# Patient Record
Sex: FEMALE | Race: WHITE | URBAN - METROPOLITAN AREA
[De-identification: names, ages, dates, MRNs, and addresses within clinical notes are randomized per-mention and may not be internally consistent; named-entity substitution may affect disease eponyms.]

---

## 2019-12-24 ENCOUNTER — OFFICE VISIT (OUTPATIENT)
Dept: INTERNAL MEDICINE CLINIC | Age: 49
End: 2019-12-24
Payer: COMMERCIAL

## 2019-12-24 VITALS
DIASTOLIC BLOOD PRESSURE: 60 MMHG | OXYGEN SATURATION: 98 % | HEIGHT: 62 IN | HEART RATE: 100 BPM | SYSTOLIC BLOOD PRESSURE: 98 MMHG | BODY MASS INDEX: 28.16 KG/M2 | WEIGHT: 153 LBS

## 2019-12-24 DIAGNOSIS — F42.2 MIXED OBSESSIONAL THOUGHTS AND ACTS: ICD-10-CM

## 2019-12-24 DIAGNOSIS — E78.00 PURE HYPERCHOLESTEROLEMIA: ICD-10-CM

## 2019-12-24 DIAGNOSIS — G43.709 CHRONIC MIGRAINE WITHOUT AURA WITHOUT STATUS MIGRAINOSUS, NOT INTRACTABLE: ICD-10-CM

## 2019-12-24 PROCEDURE — 99204 OFFICE O/P NEW MOD 45 MIN: CPT | Performed by: INTERNAL MEDICINE

## 2019-12-24 RX ORDER — BUTALBITAL, ACETAMINOPHEN AND CAFFEINE 300; 40; 50 MG/1; MG/1; MG/1
1 CAPSULE ORAL EVERY 4 HOURS PRN
COMMUNITY
End: 2020-11-20

## 2019-12-24 RX ORDER — RIZATRIPTAN BENZOATE 10 MG/1
TABLET ORAL
Qty: 30 TABLET | Refills: 2 | Status: SHIPPED
Start: 2019-12-24 | End: 2020-11-20 | Stop reason: ALTCHOICE

## 2019-12-24 RX ORDER — TOPIRAMATE 25 MG/1
25 TABLET ORAL 2 TIMES DAILY
COMMUNITY
End: 2019-12-24 | Stop reason: SDUPTHER

## 2019-12-24 RX ORDER — RIZATRIPTAN BENZOATE 10 MG/1
10 TABLET ORAL
COMMUNITY
End: 2019-12-24

## 2019-12-24 RX ORDER — ONDANSETRON 4 MG/1
4 TABLET, ORALLY DISINTEGRATING ORAL EVERY 8 HOURS PRN
Qty: 12 TABLET | Refills: 1 | Status: SHIPPED | OUTPATIENT
Start: 2019-12-24 | End: 2020-11-20

## 2019-12-24 RX ORDER — TOPIRAMATE 50 MG/1
50 TABLET, FILM COATED ORAL 2 TIMES DAILY
Qty: 60 TABLET | Refills: 5 | Status: SHIPPED
Start: 2019-12-24 | End: 2020-03-03 | Stop reason: DRUGHIGH

## 2019-12-24 SDOH — HEALTH STABILITY: MENTAL HEALTH: HOW OFTEN DO YOU HAVE A DRINK CONTAINING ALCOHOL?: 2-4 TIMES A MONTH

## 2019-12-24 SDOH — HEALTH STABILITY: MENTAL HEALTH: HOW MANY STANDARD DRINKS CONTAINING ALCOHOL DO YOU HAVE ON A TYPICAL DAY?: 1 OR 2

## 2019-12-24 ASSESSMENT — PATIENT HEALTH QUESTIONNAIRE - PHQ9
SUM OF ALL RESPONSES TO PHQ9 QUESTIONS 1 & 2: 0
SUM OF ALL RESPONSES TO PHQ QUESTIONS 1-9: 0
SUM OF ALL RESPONSES TO PHQ QUESTIONS 1-9: 0
1. LITTLE INTEREST OR PLEASURE IN DOING THINGS: 0
2. FEELING DOWN, DEPRESSED OR HOPELESS: 0

## 2020-02-23 ENCOUNTER — PATIENT MESSAGE (OUTPATIENT)
Dept: INTERNAL MEDICINE CLINIC | Age: 50
End: 2020-02-23

## 2020-03-03 RX ORDER — TOPIRAMATE 100 MG/1
100 TABLET, FILM COATED ORAL 2 TIMES DAILY
Qty: 60 TABLET | Refills: 3 | Status: SHIPPED | OUTPATIENT
Start: 2020-03-03 | End: 2020-07-13

## 2020-05-29 ENCOUNTER — VIRTUAL VISIT (OUTPATIENT)
Dept: INTERNAL MEDICINE CLINIC | Age: 50
End: 2020-05-29
Payer: COMMERCIAL

## 2020-05-29 VITALS — BODY MASS INDEX: 27.76 KG/M2 | WEIGHT: 151.8 LBS | TEMPERATURE: 96.9 F

## 2020-05-29 PROBLEM — F41.9 ANXIETY: Status: ACTIVE | Noted: 2018-11-07

## 2020-05-29 PROCEDURE — 99214 OFFICE O/P EST MOD 30 MIN: CPT | Performed by: INTERNAL MEDICINE

## 2020-05-29 SDOH — ECONOMIC STABILITY: INCOME INSECURITY: HOW HARD IS IT FOR YOU TO PAY FOR THE VERY BASICS LIKE FOOD, HOUSING, MEDICAL CARE, AND HEATING?: NOT HARD AT ALL

## 2020-05-29 SDOH — ECONOMIC STABILITY: FOOD INSECURITY: WITHIN THE PAST 12 MONTHS, THE FOOD YOU BOUGHT JUST DIDN'T LAST AND YOU DIDN'T HAVE MONEY TO GET MORE.: NEVER TRUE

## 2020-05-29 SDOH — ECONOMIC STABILITY: FOOD INSECURITY: WITHIN THE PAST 12 MONTHS, YOU WORRIED THAT YOUR FOOD WOULD RUN OUT BEFORE YOU GOT MONEY TO BUY MORE.: NEVER TRUE

## 2020-05-29 ASSESSMENT — PATIENT HEALTH QUESTIONNAIRE - PHQ9
SUM OF ALL RESPONSES TO PHQ9 QUESTIONS 1 & 2: 0
SUM OF ALL RESPONSES TO PHQ QUESTIONS 1-9: 0
2. FEELING DOWN, DEPRESSED OR HOPELESS: 0
1. LITTLE INTEREST OR PLEASURE IN DOING THINGS: 0
SUM OF ALL RESPONSES TO PHQ QUESTIONS 1-9: 0

## 2020-07-13 RX ORDER — TOPIRAMATE 100 MG/1
TABLET, FILM COATED ORAL
Qty: 60 TABLET | Refills: 5 | Status: SHIPPED | OUTPATIENT
Start: 2020-07-13

## 2020-10-26 ENCOUNTER — NURSE ONLY (OUTPATIENT)
Dept: INTERNAL MEDICINE CLINIC | Age: 50
End: 2020-10-26
Payer: COMMERCIAL

## 2020-10-26 PROCEDURE — 90471 IMMUNIZATION ADMIN: CPT | Performed by: INTERNAL MEDICINE

## 2020-10-26 PROCEDURE — 90686 IIV4 VACC NO PRSV 0.5 ML IM: CPT | Performed by: INTERNAL MEDICINE

## 2020-11-20 RX ORDER — UBROGEPANT 100 MG/1
1 TABLET ORAL PRN
COMMUNITY

## 2020-11-23 NOTE — PROGRESS NOTES
2020    TELEHEALTH EVALUATION -- Audio/Visual ([facility])    HPI:  Diego Barragan (:  1970) has requested an audio/video evaluation for the following concern(s):    Joint Symptoms:  Patient complains of a 6 month history of pain in left shoulder. Pain is persistent, sharp in nature, and is moderate in intensity. Radiation: into hand. Associated symptoms:  weakness- throughout arm and paresthesia- throughout arm. She denies any other symptoms. Symptoms are exacerbated by abduction, laying down. Precipitating factors: no known injury. Symptoms are worse in the middle of the night. Prior history of similar symptoms: no, but has had issues on the right that have improved. Previous treatment: stretching. Symptoms are worsening over time. Chronic Headache:  Patient presents for follow-up of migraine headache. Patient saw [address] [place] [address] [address] neurology - had 1 round of Botox- effective, but has worn off. Had only 1-2 episodes over the 3 months after injection, but has had 7 over the past month. Recent change in symptom quality or new associated symptoms:  no.  Current triggers:  alcohol, menses and weather changes. Current abortive treatment includes Ubrelvy 100 mg prn- more effective than MaxAlt and Fioricet. Preventive treatment: anti-convulsant- topamax 150 mg qam and 100 mg qpm.  Medication side effects: none. Emergency department/urgent care visits for headache since last visit: none. Recent diagnostic testing: none. Hyperlipidemia:  Patient is following a low fat, low cholesterol diet. She is exercising regularly- walking. Mood Disorder:  Patient presents for follow-up of obsessive-compulsive disorder and anxiety. Current complaints include:  Irritability, compulsive behaviors: cleanliness and organization, mild social anxiety- a little better.   She denies anhedonia, depressed mood, tearfulness, feelings of hopelessness, excessive worry, panic attacks and suicidal thoughts or behavior. Symptoms/signs of licha: none. External stressors: none. Current treatment includes: Zoloft- 100 mg qd. Medication side effects: none. Prior to Visit Medications    Medication Sig Taking? Authorizing Provider   sertraline (ZOLOFT) 100 MG tablet Take 1 tablet by mouth daily Yes Cely Burgess MD   Ubrogepant (UBRELVY) 100 MG TABS Take 1 tablet by mouth as needed Yes Historical Provider, MD   topiramate (TOPAMAX) 100 MG tablet TAKE ONE TABLET BY MOUTH TWICE A DAY  Patient taking differently: Take 150 mg by mouth 2 times daily  Yes Cely Burgess MD       Patient-Reported Vitals 11/24/2020   Patient-Reported Weight 151.2   Patient-Reported Systolic 93   Patient-Reported Diastolic 74   Patient-Reported Pulse 76        Wt Readings from Last 3 Encounters:   05/29/20 151 lb 12.8 oz (68.9 kg)   12/24/19 153 lb (69.4 kg)     BP Readings from Last 3 Encounters:   12/24/19 98/60       Review of Systems:   As documented in HPI     Physical Exam  Constitutional:       General: She is not in acute distress. Appearance: She is well-developed. HENT:      Head: Normocephalic and atraumatic. Mouth/Throat:      Lips: No lesions. Neck:      Comments: No visible mass  Pulmonary:      Effort: Pulmonary effort is normal. No respiratory distress. Skin:     Comments: No rash, erythema or other discoloration on facial skin and exposed areas of neck and upper extremites    Neurological:      Mental Status: She is alert. Comments: No facial asymmetry (cranial nerve 7 motor function) or gaze palsy   Psychiatric:         Attention and Perception: Attention normal.         Mood and Affect: Mood normal.         Speech: Speech normal.         Behavior: Behavior normal.         Thought Content:  Thought content normal.         Cognition and Memory: Cognition normal.        No results found for: CHOLFAST, TRIGLYCFAST, HDL, LDLCALC, LDLDIRECT  No results found for: GLUF, LABA1C  No results found for: NA, K, BUN, CREATININE, LABGLOM, GFRAA, CALCIUM, VITD25  No results found for: ALT, AST  No results found for: HGB  No results found for: TSHREFLEX, TSH        ASSESSMENT/PLAN:    1. Acute pain of left shoulder  Clinical picture most consistent with impingement syndrome. Ice, NSAID, ortho referral.  - Keagan Mathis MD, Orthopedic Surgery, [facility]    2. Chronic migraine without aura without status migrainosus, not intractable  Dramatic improvement with Botox injections- she will schedule second round. Continue other preventive and symptomatic therapies. 3. Pure hypercholesterolemia  Importance of continued lifestyle changes stressed to help prevent need for cholesterol medication in the future. 4. Mixed obsessional thoughts and acts  Well-controlled on higher dose of Zoloft- continue. 5. Anxiety  Residual symptoms related to effects of COVID-19 on family life- offered appointment with Dr. Carmencita Ng for CBT. Continue current dose of Zoloft. Return in about 6 months (around 5/24/2021) for CPE. An  electronic signature was used to authenticate this note. --Karen Serrano MD on 11/24/2020 at 9:58 AM    Pursuant to the emergency declaration under the [facility] waiver authority and the [employer] Act, this Virtual  Visit was conducted, with patient's consent, to reduce the patient's risk of exposure to COVID-19 and provide continuity of care for an established patient. Services were provided through a video synchronous discussion virtually to substitute for in-person clinic visit.

## 2020-11-24 ENCOUNTER — VIRTUAL VISIT (OUTPATIENT)
Dept: INTERNAL MEDICINE CLINIC | Age: 50
End: 2020-11-24
Payer: COMMERCIAL

## 2020-11-24 PROBLEM — M25.512 ACUTE PAIN OF LEFT SHOULDER: Status: ACTIVE | Noted: 2020-11-24

## 2020-11-24 PROCEDURE — 99214 OFFICE O/P EST MOD 30 MIN: CPT | Performed by: INTERNAL MEDICINE

## 2020-11-24 RX ORDER — SERTRALINE HYDROCHLORIDE 100 MG/1
100 TABLET, FILM COATED ORAL DAILY
Qty: 90 TABLET | Refills: 1 | Status: SHIPPED | OUTPATIENT
Start: 2020-11-24 | End: 2021-07-02

## 2020-11-24 NOTE — PATIENT INSTRUCTIONS
Labs currently open:  [address]  [place], [address]   M-F 7:30am - 4pm  [address] [facility], [address]                                       M-F 7a-6p; Sa 8a-12p        [address], [address]   [place] [facility] [place] [facility]", [address] [facility]  M-F 7:30am - 5pm    [facility] and [address]  M-F 8a-4:30p  [facility] [address]  [address] [facility], [address]  [facility] [place], Fr 7:30 a-5 p  Wed 7:30 a-12 p  [phone]    [facility]   [address] [facility], [facility]                         M-F 7a-5p                              [address] [facility]  [address] [facility], [address] [facility] [address]   [facility], [address] [place] [address]  M-F [address], IBB 8am-Noon  [phone]    [address], [facility] [address]                                                      M-F 8a-5:30p                        [phone]

## 2020-12-08 PROBLEM — M75.02 ADHESIVE CAPSULITIS OF LEFT SHOULDER: Status: ACTIVE | Noted: 2020-11-24

## 2021-05-26 NOTE — PROGRESS NOTES
2021    Kiara Jacques (: 1970) is a 53 y.o. female who presents for a preventive medicine evaluation. Patient Active Problem List   Diagnosis    Migraine    Hyperlipidemia    OCD (obsessive compulsive disorder)    Lumbago    Anxiety       Review of Systems   Constitutional: Negative. HENT: Negative. Eyes: Negative. Respiratory: Negative. Cardiovascular: Negative. Gastrointestinal: Negative. Genitourinary: Negative. Musculoskeletal: Positive for arthralgias (occasional knee pain). Skin: Negative. Neurological: Negative. Psychiatric/Behavioral: Negative. Allergies   Allergen Reactions    Other Nausea And Vomiting and Other (See Comments)     Medication name is Percodan     Prior to Visit Medications    Medication Sig Taking? Authorizing Provider   sertraline (ZOLOFT) 100 MG tablet Take 1 tablet by mouth daily Yes Ling Hurley MD   topiramate (TOPAMAX) 100 MG tablet TAKE ONE TABLET BY MOUTH TWICE A DAY  Patient taking differently: Take 150 mg by mouth 2 times daily  Yes Ling Hurley MD   Ubrogepant (UBRELVY) 100 MG TABS Take 1 tablet by mouth as needed  Patient not taking: Reported on 2021  Historical Provider, MD        Past Medical History:   Diagnosis Date    Adhesive capsulitis of left shoulder 2020    Hyperlipidemia     Migraine     OCD (obsessive compulsive disorder)      History reviewed. No pertinent surgical history.   Family History   Problem Relation Age of Onset    Thyroid Disease Mother         Cleavon Karina Elevated Lipids Mother     Kidney Cancer Maternal Grandfather     Diabetes Maternal Uncle     Parkinsonism Maternal Uncle     Hypertension Maternal Uncle     Elevated Lipids Maternal Uncle     Hashimoto Thyroiditis Paternal Grandmother     Other Paternal Grandmother         Aneursym       Vitals:    21 1127   BP: 104/70   Pulse: 76   Resp: 16   SpO2: 98%   Weight: 151 lb (68.5 kg)   Height: 5' 2\" (1.575 m) Estimated body mass index is 27.62 kg/m² as calculated from the following:    Height as of this encounter: 5' 2\" (1.575 m). Weight as of this encounter: 151 lb (68.5 kg). Wt Readings from Last 3 Encounters:   05/27/21 151 lb (68.5 kg)   05/29/20 151 lb 12.8 oz (68.9 kg)   12/24/19 153 lb (69.4 kg)     BP Readings from Last 3 Encounters:   05/27/21 104/70   12/24/19 98/60     Physical Exam  Constitutional:       General: She is not in acute distress. Appearance: She is well-developed. HENT:      Head: Normocephalic and atraumatic. Right Ear: Tympanic membrane, ear canal and external ear normal.      Left Ear: Tympanic membrane, ear canal and external ear normal.      Mouth/Throat:      Pharynx: No oropharyngeal exudate or posterior oropharyngeal erythema. Eyes:      General: Lids are normal.      Conjunctiva/sclera: Conjunctivae normal.      Pupils: Pupils are equal, round, and reactive to light. Neck:      Thyroid: No thyroid mass or thyromegaly. Vascular: No carotid bruit. Cardiovascular:      Rate and Rhythm: Normal rate and regular rhythm. Heart sounds: Normal heart sounds. No murmur heard. No friction rub. No gallop. Pulmonary:      Effort: Pulmonary effort is normal. No respiratory distress. Breath sounds: Normal breath sounds. No wheezing, rhonchi or rales. Chest:      Breasts:         Left: Mass: Breast exam deferred to GYN. Abdominal:      General: Bowel sounds are normal. There is no distension. Palpations: Abdomen is soft. There is no mass. Tenderness: There is no abdominal tenderness. Musculoskeletal:         General: No tenderness. Normal range of motion. Cervical back: Neck supple. Right lower leg: No edema. Left lower leg: No edema. Lymphadenopathy:      Cervical: No cervical adenopathy. Skin:     General: Skin is warm and dry. Findings: No erythema or rash. Comments: No suspicious lesions.    Neurological: Mental Status: She is alert and oriented to person, place, and time. Coordination: Coordination normal.      Deep Tendon Reflexes: Reflexes are normal and symmetric. Psychiatric:         Speech: Speech normal.         Behavior: Behavior normal.         Thought Content: Thought content normal.         Judgment: Judgment normal.         Lab Results   Component Value Date    CHOLFAST 218 (H) 11/25/2020    TRIGLYCFAST 141 11/25/2020    HDL 54 11/25/2020    LDLCALC 136 (H) 11/25/2020     Lab Results   Component Value Date    GLUF 87 11/25/2020    LABA1C 5.3 11/25/2020     Lab Results   Component Value Date     11/25/2020    K 4.1 11/25/2020    BUN 21 (H) 11/25/2020    CREATININE 0.8 11/25/2020    LABGLOM >60 11/25/2020    GFRAA >60 11/25/2020    CALCIUM 9.5 11/25/2020     Lab Results   Component Value Date    ALT 10 11/25/2020    AST 16 11/25/2020     No results found for: HGB  Lab Results   Component Value Date    TSHREFLEX 2.23 11/25/2020        Preventive Care:  Eye exam within the past 2 years? yes  Dental exam within the past year? yes  Seatbelt used consistently: yes  Working smoke and carbon monoxide detectors in home: yes   Avoiding any major food groups? No  Exercising at least 150 minutes/week?  yes  Advance Directive: Y    Social History     Tobacco Use    Smoking status: Never Smoker    Smokeless tobacco: Never Used   Substance Use Topics    Alcohol use: Yes     Comment: Socially     Social History     Substance and Sexual Activity   Sexual Activity Yes    Partners: Male       Immunization History   Administered Date(s) Administered    Influenza, Quadv, IM, PF (6 mo and older Fluzone, Flulaval, Fluarix, and 3 yrs and older Afluria) 10/26/2020     Health Maintenance   Topic Date Due    Cervical cancer screen  05/04/2019    DTaP/Tdap/Td vaccine (2 - Td) 10/09/2022    Diabetes screen  11/25/2023    Lipid screen  11/25/2025    Flu vaccine  Completed    COVID-19 Vaccine  Completed    Hepatitis A vaccine  Aged Out    Hepatitis B vaccine  Aged Out    Hib vaccine  Aged Out    Meningococcal (ACWY) vaccine  Aged Out    Pneumococcal 0-64 years Vaccine  Aged Out    Hepatitis C screen  Discontinued    HIV screen  Discontinued       Assessment/Plan:  Annual physical exam  PAP UTD- report requested. Patient has an Advanced Directive which accurately reflects her wishes, but a copy has not been provided. Copy requested- will scan into the electronic medical record once received. Return in about 1 year (around 5/27/2022) for CPE.    --Keshia Vera MD on 5/27/2021 at 1:44 PM    An electronic signature was used to authenticate this note.

## 2021-05-27 ENCOUNTER — OFFICE VISIT (OUTPATIENT)
Dept: INTERNAL MEDICINE CLINIC | Age: 51
End: 2021-05-27
Payer: COMMERCIAL

## 2021-05-27 VITALS
RESPIRATION RATE: 16 BRPM | DIASTOLIC BLOOD PRESSURE: 70 MMHG | WEIGHT: 151 LBS | SYSTOLIC BLOOD PRESSURE: 104 MMHG | HEART RATE: 76 BPM | HEIGHT: 62 IN | BODY MASS INDEX: 27.79 KG/M2 | OXYGEN SATURATION: 98 %

## 2021-05-27 DIAGNOSIS — Z00.00 ANNUAL PHYSICAL EXAM: Primary | ICD-10-CM

## 2021-05-27 PROBLEM — M75.02 ADHESIVE CAPSULITIS OF LEFT SHOULDER: Status: RESOLVED | Noted: 2020-11-24 | Resolved: 2021-05-27

## 2021-05-27 PROCEDURE — 99396 PREV VISIT EST AGE 40-64: CPT | Performed by: INTERNAL MEDICINE

## 2021-05-27 SDOH — ECONOMIC STABILITY: FOOD INSECURITY: WITHIN THE PAST 12 MONTHS, THE FOOD YOU BOUGHT JUST DIDN'T LAST AND YOU DIDN'T HAVE MONEY TO GET MORE.: NEVER TRUE

## 2021-05-27 SDOH — ECONOMIC STABILITY: TRANSPORTATION INSECURITY
IN THE PAST 12 MONTHS, HAS LACK OF TRANSPORTATION KEPT YOU FROM MEETINGS, WORK, OR FROM GETTING THINGS NEEDED FOR DAILY LIVING?: NO

## 2021-05-27 SDOH — ECONOMIC STABILITY: INCOME INSECURITY: IN THE LAST 12 MONTHS, WAS THERE A TIME WHEN YOU WERE NOT ABLE TO PAY THE MORTGAGE OR RENT ON TIME?: NO

## 2021-05-27 SDOH — HEALTH STABILITY: PHYSICAL HEALTH: ON AVERAGE, HOW MANY DAYS PER WEEK DO YOU ENGAGE IN MODERATE TO STRENUOUS EXERCISE (LIKE A BRISK WALK)?: 2 DAYS

## 2021-05-27 ASSESSMENT — SOCIAL DETERMINANTS OF HEALTH (SDOH)
WITHIN THE LAST YEAR, HAVE TO BEEN RAPED OR FORCED TO HAVE ANY KIND OF SEXUAL ACTIVITY BY YOUR PARTNER OR EX-PARTNER?: NO
HOW OFTEN DO YOU ATTEND CHURCH OR RELIGIOUS SERVICES?: NEVER
DO YOU BELONG TO ANY CLUBS OR ORGANIZATIONS SUCH AS CHURCH GROUPS UNIONS, FRATERNAL OR ATHLETIC GROUPS, OR SCHOOL GROUPS?: NO
HOW OFTEN DO YOU ATTENT MEETINGS OF THE CLUB OR ORGANIZATION YOU BELONG TO?: NEVER
WITHIN THE LAST YEAR, HAVE YOU BEEN HUMILIATED OR EMOTIONALLY ABUSED IN OTHER WAYS BY YOUR PARTNER OR EX-PARTNER?: NO
WITHIN THE LAST YEAR, HAVE YOU BEEN AFRAID OF YOUR PARTNER OR EX-PARTNER?: NO

## 2021-05-27 ASSESSMENT — ENCOUNTER SYMPTOMS
EYES NEGATIVE: 1
GASTROINTESTINAL NEGATIVE: 1
RESPIRATORY NEGATIVE: 1

## 2021-05-27 ASSESSMENT — PATIENT HEALTH QUESTIONNAIRE - PHQ9
SUM OF ALL RESPONSES TO PHQ QUESTIONS 1-9: 0
SUM OF ALL RESPONSES TO PHQ QUESTIONS 1-9: 0
1. LITTLE INTEREST OR PLEASURE IN DOING THINGS: 0

## 2021-07-02 RX ORDER — SERTRALINE HYDROCHLORIDE 100 MG/1
TABLET, FILM COATED ORAL
Qty: 90 TABLET | Refills: 0 | Status: SHIPPED | OUTPATIENT
Start: 2021-07-02 | End: 2021-10-04

## 2021-08-09 ENCOUNTER — E-VISIT (OUTPATIENT)
Dept: INTERNAL MEDICINE CLINIC | Age: 51
End: 2021-08-09
Payer: COMMERCIAL
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
## 2021-08-09 DIAGNOSIS — S40.869A INSECT BITE OF UPPER ARM, UNSPECIFIED LATERALITY, INITIAL ENCOUNTER: Primary | ICD-10-CM

## 2021-08-09 PROCEDURE — 99421 OL DIG E/M SVC 5-10 MIN: CPT | Performed by: INTERNAL MEDICINE

## 2021-08-09 RX ORDER — MOMETASONE FUROATE 1 MG/G
CREAM TOPICAL
Qty: 1 TUBE | Refills: 0 | Status: SHIPPED | OUTPATIENT
Start: 2021-08-09 | End: 2022-09-13 | Stop reason: ALTCHOICE

## 2021-08-09 NOTE — PROGRESS NOTES
HPI: as per patient provided history  Exam: N/A (electronic visit)  ASSESSMENT/PLAN:  1. Insect bite of upper arm, unspecified laterality, initial encounter  No signs or symptoms of infection. - mometasone (ELOCON) 0.1 % cream; Apply thin layer to affected areas topically 2x/day up to 2 weeks. Avoid use on face and groin. Dispense: 1 Tube; Refill: 0    Patient instructed to call the office if worsens, or fails to improve as anticipated. 5-10 minutes were spent on the digital evaluation and management of this patient.

## 2021-10-04 RX ORDER — SERTRALINE HYDROCHLORIDE 100 MG/1
TABLET, FILM COATED ORAL
Qty: 30 TABLET | Refills: 5 | Status: SHIPPED | OUTPATIENT
Start: 2021-10-04 | End: 2022-05-03 | Stop reason: SDUPTHER

## 2022-05-03 ENCOUNTER — PATIENT MESSAGE (OUTPATIENT)
Dept: INTERNAL MEDICINE CLINIC | Age: 52
End: 2022-05-03

## 2022-05-03 RX ORDER — SERTRALINE HYDROCHLORIDE 100 MG/1
TABLET, FILM COATED ORAL
Qty: 30 TABLET | Refills: 0 | Status: SHIPPED | OUTPATIENT
Start: 2022-05-03 | End: 2022-05-29

## 2022-05-03 NOTE — TELEPHONE ENCOUNTER
From: Pepper Wing  To: Dr. Myra Bingham: 5/3/2022 8:46 AM EDT  Subject: refill on Sertraline    The pharmacy was suppose to contact you about a refill on my sertraline, but I it hasn't been filled, so I wanted to contact you to see if you can put that in. I have been out for a couple of days now, so I didn't want to wait on them, in case it slipped through the cracks on their end. Thanks so much!

## 2022-05-03 NOTE — TELEPHONE ENCOUNTER
Last appointment: 8/9/2021  Next appointment: Visit date not found  Last refill: 10/4/21  Sent Nasseo message to schedule due/overdue appointment.

## 2022-05-29 RX ORDER — SERTRALINE HYDROCHLORIDE 100 MG/1
TABLET, FILM COATED ORAL
Qty: 30 TABLET | Refills: 1 | Status: SHIPPED | OUTPATIENT
Start: 2022-05-29 | End: 2022-07-19 | Stop reason: SDUPTHER

## 2022-07-14 NOTE — PROGRESS NOTES
Date of Visit:  2022    CC: Salvatore Dixon (: 1970) is a 49 y.o. female, established patient, here for evaluation/re-evaluation of the following medical concerns:    ASSESSMENT/PLAN:  {There are no diagnoses linked to this encounter. (Refresh or delete this SmartLink)}   No follow-ups on file. There were no vitals filed for this visit. Estimated body mass index is 27.62 kg/m² as calculated from the following:    Height as of 21: 5' 2\" (1.575 m). Weight as of 21: 151 lb (68.5 kg). Wt Readings from Last 3 Encounters:   21 151 lb (68.5 kg)   20 151 lb 12.8 oz (68.9 kg)   19 153 lb (69.4 kg)     BP Readings from Last 3 Encounters:   21 104/70   19 98/60     HPI  Chronic Headache:  Patient presents for follow-up of migraine headache.  *** episodes/month. Recent change in symptom quality or new associated symptoms:  no.  Current triggers:  alcohol, menses and weather changes. Current abortive treatment includes Ubrelvy 100 mg prn- more effective than MaxAlt and Fioricet. Preventive treatment: anti-convulsant- topamax 150 mg qam and 100 mg qpm.  Medication side effects: none. Emergency department/urgent care visits for headache since last visit: none. Recent diagnostic testing: none. Hyperlipidemia:  Patient is following a low fat, low cholesterol diet. She is exercising regularly- walking. Mood Disorder:  Patient presents for follow-up of obsessive-compulsive disorder and anxiety. Current complaints include:  Irritability, compulsive behaviors: cleanliness and organization, mild social anxiety- a little better. She denies anhedonia, depressed mood, tearfulness, feelings of hopelessness, excessive worry, panic attacks and suicidal thoughts or behavior. Symptoms/signs of licha: none. External stressors: ***. Current treatment includes: Zoloft- 100 mg qd. Medication side effects: none.     ROS  As documented above    Physical Exam  Constitutional: General: She is not in acute distress. Appearance: She is well-developed. Eyes:      Conjunctiva/sclera: Conjunctivae normal.   Neck:      Thyroid: No thyroid mass or thyromegaly. Cardiovascular:      Rate and Rhythm: Normal rate and regular rhythm. Heart sounds: Normal heart sounds. No murmur heard. No friction rub. No gallop. Pulmonary:      Effort: Pulmonary effort is normal. No respiratory distress. Breath sounds: Normal breath sounds. No wheezing, rhonchi or rales. Musculoskeletal:      Right lower leg: No edema. Left lower leg: No edema. Lymphadenopathy:      Cervical: No cervical adenopathy. Skin:     General: Skin is warm and dry. Findings: No erythema or rash. Neurological:      Mental Status: She is alert and oriented to person, place, and time. Psychiatric:         Speech: Speech normal.         Behavior: Behavior normal.         Thought Content: Thought content normal.         Judgment: Judgment normal.       {Time Documentation Optional:[id number]}    --Caleb De La O MD on 7/14/2022 at 9:59 AM    An electronic signature was used to authenticate this note.

## 2022-07-19 ENCOUNTER — OFFICE VISIT (OUTPATIENT)
Dept: INTERNAL MEDICINE CLINIC | Age: 52
End: 2022-07-19
Payer: COMMERCIAL

## 2022-07-19 VITALS
HEIGHT: 62 IN | DIASTOLIC BLOOD PRESSURE: 72 MMHG | OXYGEN SATURATION: 99 % | HEART RATE: 98 BPM | SYSTOLIC BLOOD PRESSURE: 118 MMHG | BODY MASS INDEX: 27.71 KG/M2 | WEIGHT: 150.6 LBS

## 2022-07-19 DIAGNOSIS — F42.2 MIXED OBSESSIONAL THOUGHTS AND ACTS: ICD-10-CM

## 2022-07-19 DIAGNOSIS — F41.9 ANXIETY: ICD-10-CM

## 2022-07-19 DIAGNOSIS — G43.709 CHRONIC MIGRAINE WITHOUT AURA WITHOUT STATUS MIGRAINOSUS, NOT INTRACTABLE: ICD-10-CM

## 2022-07-19 DIAGNOSIS — Z00.00 ANNUAL PHYSICAL EXAM: Primary | ICD-10-CM

## 2022-07-19 DIAGNOSIS — E78.00 PURE HYPERCHOLESTEROLEMIA: ICD-10-CM

## 2022-07-19 PROCEDURE — 90471 IMMUNIZATION ADMIN: CPT | Performed by: INTERNAL MEDICINE

## 2022-07-19 PROCEDURE — 99396 PREV VISIT EST AGE 40-64: CPT | Performed by: INTERNAL MEDICINE

## 2022-07-19 PROCEDURE — 90750 HZV VACC RECOMBINANT IM: CPT | Performed by: INTERNAL MEDICINE

## 2022-07-19 RX ORDER — BUPROPION HYDROCHLORIDE 150 MG/1
150 TABLET ORAL EVERY MORNING
Qty: 30 TABLET | Refills: 2 | Status: SHIPPED | OUTPATIENT
Start: 2022-07-19 | End: 2022-10-18

## 2022-07-19 RX ORDER — SERTRALINE HYDROCHLORIDE 100 MG/1
TABLET, FILM COATED ORAL
Qty: 90 TABLET | Refills: 1 | Status: SHIPPED | OUTPATIENT
Start: 2022-07-19

## 2022-07-19 SDOH — ECONOMIC STABILITY: FOOD INSECURITY: WITHIN THE PAST 12 MONTHS, THE FOOD YOU BOUGHT JUST DIDN'T LAST AND YOU DIDN'T HAVE MONEY TO GET MORE.: NEVER TRUE

## 2022-07-19 SDOH — ECONOMIC STABILITY: FOOD INSECURITY: WITHIN THE PAST 12 MONTHS, YOU WORRIED THAT YOUR FOOD WOULD RUN OUT BEFORE YOU GOT MONEY TO BUY MORE.: NEVER TRUE

## 2022-07-19 ASSESSMENT — ENCOUNTER SYMPTOMS
EYES NEGATIVE: 1
GASTROINTESTINAL NEGATIVE: 1
RESPIRATORY NEGATIVE: 1

## 2022-07-19 ASSESSMENT — PATIENT HEALTH QUESTIONNAIRE - PHQ9
SUM OF ALL RESPONSES TO PHQ QUESTIONS 1-9: 0
SUM OF ALL RESPONSES TO PHQ9 QUESTIONS 1 & 2: 0
SUM OF ALL RESPONSES TO PHQ QUESTIONS 1-9: 0
SUM OF ALL RESPONSES TO PHQ QUESTIONS 1-9: 0
2. FEELING DOWN, DEPRESSED OR HOPELESS: 0
1. LITTLE INTEREST OR PLEASURE IN DOING THINGS: 0
SUM OF ALL RESPONSES TO PHQ QUESTIONS 1-9: 0

## 2022-07-19 ASSESSMENT — SOCIAL DETERMINANTS OF HEALTH (SDOH): HOW HARD IS IT FOR YOU TO PAY FOR THE VERY BASICS LIKE FOOD, HOUSING, MEDICAL CARE, AND HEATING?: NOT HARD AT ALL

## 2022-07-19 NOTE — ASSESSMENT & PLAN NOTE
Improved with Topamax and Botox for prevention and Ubrevly for acute episodes, but still having significant residual symptoms. She plans to seek a second opinion from [address] [facility] specialist, Dr. Ulices Hurt, at [facility].   Discussed possible benefits of magnesium and B vitamin supplements and neuromodulation devices, such as Cephaly- she will discuss with Dr. Ulices Hurt,

## 2022-07-19 NOTE — PROGRESS NOTES
2022    Tavares Pavon (: 1970) is a 49 y.o. female who presents for a preventive medicine evaluation. Patient Active Problem List   Diagnosis    Migraine    Hyperlipidemia    OCD (obsessive compulsive disorder)    Lumbago    Anxiety     Review of Systems   Constitutional:  Positive for fatigue. HENT: Negative. Eyes: Negative. Respiratory: Negative. Cardiovascular: Negative. Gastrointestinal: Negative. Genitourinary: Negative. Musculoskeletal: Negative. Skin: Negative. Neurological: Negative. Psychiatric/Behavioral: Negative. Complains of lethargy, sleep disturbance, irritability, anhedonia on 100 mg dose of Zoloft. External stressors at home worsening. No therapy. Allergies   Allergen Reactions    Other Nausea And Vomiting and Other (See Comments)     Medication name is Percodan     Prior to Visit Medications    Medication Sig Taking? Authorizing Provider   buPROPion (WELLBUTRIN XL) 150 MG extended release tablet Take 1 tablet by mouth every morning Yes Daniella Crystal MD   sertraline (ZOLOFT) 100 MG tablet TAKE ONE TABLET BY MOUTH DAILY Yes Daniella Crystal MD   Ubrogepant (UBRELVY) 100 MG TABS Take 1 tablet by mouth as needed Yes Historical Provider, MD   topiramate (TOPAMAX) 100 MG tablet TAKE ONE TABLET BY MOUTH TWICE A DAY  Patient taking differently: Take 150 mg by mouth in the morning and 150 mg before bedtime. Yes Daniella Crystal MD   mometasone (ELOCON) 0.1 % cream Apply thin layer to affected areas topically 2x/day up to 2 weeks. Avoid use on face and groin. Patient not taking: Reported on 2022  Daniella Crystal MD        Past Medical History:   Diagnosis Date    Adhesive capsulitis of left shoulder 2020    Hyperlipidemia     Migraine     OCD (obsessive compulsive disorder)      History reviewed. No pertinent surgical history.   Family History   Problem Relation Age of Onset    Thyroid Disease Mother         Joycelyn Martinez Elevated Lipids Mother     Kidney Cancer Maternal Grandfather     Diabetes Maternal Uncle     Parkinsonism Maternal Uncle     Hypertension Maternal Uncle     Elevated Lipids Maternal Uncle     Hashimoto Thyroiditis Paternal Grandmother     Other Paternal Grandmother         Aneursym       Vitals:    07/19/22 0930   BP: 118/72   Site: Right Upper Arm   Position: Sitting   Cuff Size: Large Adult   Pulse: 98   SpO2: 99%   Weight: 150 lb 9.6 oz (68.3 kg)   Height: 5' 2\" (1.575 m)      Estimated body mass index is 27.55 kg/m² as calculated from the following:    Height as of this encounter: 5' 2\" (1.575 m). Weight as of this encounter: 150 lb 9.6 oz (68.3 kg). Wt Readings from Last 3 Encounters:   07/19/22 150 lb 9.6 oz (68.3 kg)   05/27/21 151 lb (68.5 kg)   05/29/20 151 lb 12.8 oz (68.9 kg)     BP Readings from Last 3 Encounters:   07/19/22 118/72   05/27/21 104/70   12/24/19 98/60       Physical Exam  Constitutional:       General: She is not in acute distress. Appearance: She is well-developed. HENT:      Head: Normocephalic and atraumatic. Right Ear: Tympanic membrane, ear canal and external ear normal.      Left Ear: Tympanic membrane, ear canal and external ear normal.   Eyes:      General: Lids are normal.      Conjunctiva/sclera: Conjunctivae normal.      Pupils: Pupils are equal, round, and reactive to light. Neck:      Thyroid: No thyroid mass or thyromegaly. Vascular: No carotid bruit. Cardiovascular:      Rate and Rhythm: Normal rate and regular rhythm. Heart sounds: Normal heart sounds. No murmur heard. No friction rub. No gallop. Pulmonary:      Effort: Pulmonary effort is normal. No respiratory distress. Breath sounds: Normal breath sounds. No wheezing, rhonchi or rales. Chest:      Comments: Breast exam per GYN  Abdominal:      General: Bowel sounds are normal. There is no distension. Palpations: Abdomen is soft. There is no mass. Tenderness:  There is no abdominal tenderness. Musculoskeletal:         General: No tenderness. Normal range of motion. Cervical back: Neck supple. Lymphadenopathy:      Cervical: No cervical adenopathy. Skin:     General: Skin is warm and dry. Findings: No erythema or rash. Comments: No suspicious lesions. Neurological:      Mental Status: She is alert and oriented to person, place, and time. Coordination: Coordination normal.      Deep Tendon Reflexes: Reflexes are normal and symmetric. Psychiatric:         Speech: Speech normal.         Behavior: Behavior normal.         Thought Content:  Thought content normal.         Judgment: Judgment normal.       Lab Results   Component Value Date    CHOLFAST 218 (H) 11/25/2020    TRIGLYCFAST 141 11/25/2020    HDL 54 11/25/2020    LDLCALC 136 (H) 11/25/2020     Lab Results   Component Value Date    GLUF 87 11/25/2020    LABA1C 5.3 11/25/2020     Lab Results   Component Value Date     11/25/2020    K 4.1 11/25/2020    BUN 21 (H) 11/25/2020    CREATININE 0.8 11/25/2020    LABGLOM >60 11/25/2020    GFRAA >60 11/25/2020    CALCIUM 9.5 11/25/2020     Lab Results   Component Value Date    ALT 10 11/25/2020    AST 16 11/25/2020     No results found for: HGB  Lab Results   Component Value Date    TSHREFLEX 2.23 11/25/2020        Social History     Tobacco Use    Smoking status: Never    Smokeless tobacco: Never   Substance Use Topics    Alcohol use: Yes     Comment: Socially     Social History     Substance and Sexual Activity   Sexual Activity Yes    Partners: Male       Immunization History   Administered Date(s) Administered    COVID-19, PFIZER PURPLE top, DILUTE for use, (age 15 y+), 30mcg/0.3mL 02/24/2021, 03/17/2021, 11/15/2021    Influenza, Quadv, IM, PF (6 mo and older Fluzone, Flulaval, Fluarix, and 3 yrs and older Afluria) 10/26/2020    Zoster Recombinant (Shingrix) 07/19/2022     Health Maintenance   Topic Date Due    Colorectal Cancer Screen  Never done Cervical cancer screen  05/04/2019    COVID-19 Vaccine (4 - Booster for Pfizer series) 03/15/2022    Depression Screen  05/27/2022    Flu vaccine (1) 09/01/2022    Shingles vaccine (2 of 2) 09/13/2022    DTaP/Tdap/Td vaccine (2 - Td or Tdap) 10/09/2022    Breast cancer screen  11/01/2022    Diabetes screen  11/25/2023    Lipids  11/25/2025    Hepatitis A vaccine  Aged Out    Hepatitis B vaccine  Aged Out    Hib vaccine  Aged Out    Meningococcal (ACWY) vaccine  Aged Out    Pneumococcal 0-64 years Vaccine  Aged Out    Hepatitis C screen  Discontinued    HIV screen  Discontinued     Assessment/Plan:  Annual physical exam  PAP UTD- report requested  She will schedule colonoscopy  Shingrix #1 today- repeat due 9/13/22  She will consider 4th COVID vaccine, but may wait until updated version available in the fall  -     Vitamin D 25 Hydroxy; Future  Chronic migraine without aura without status migrainosus, not intractable  Assessment & Plan:  Improved with Topamax and Botox for prevention and Ubrevly for acute episodes, but still having significant residual symptoms. She plans to seek a second opinion from [facility] specialist, Dr. Marcos Maki, at [facility]. Discussed possible benefits of magnesium and B vitamin supplements and neuromodulation devices, such as Cephaly- she will discuss with Dr. Marcos Maki,   Pure hypercholesterolemia  Assessment & Plan:  Importance of continued lifestyle changes stressed to help prevent need for cholesterol medication in the future. Orders:  -     Lipid, Fasting; Future  -     Comprehensive Metabolic Panel, Fasting; Future  -     TSH with Reflex; Future  Anxiety  Assessment & Plan:  Adequately controlled on current dose of Zoloft, but will add low dose Wellbutrin XL to address anhedonia and lethargy. [facility] to address sleep disturbance and irritability related to increase in external stressors. Mixed obsessional thoughts and acts  Assessment & Plan:  Well-controlled.   See plan for anxiety. Return in about 2 months (around 9/19/2022) for 30 MIN F/U- Video. --Jeovany Hooker MD on 7/19/2022 at 5:43 PM    An electronic signature was used to authenticate this note.

## 2022-07-19 NOTE — ASSESSMENT & PLAN NOTE
Adequately controlled on current dose of Zoloft, but will add low dose Wellbutrin XL to address anhedonia and lethargy. Suggested referral to [facility] to address sleep disturbance and irritability related to increase in external stressors.

## 2022-07-29 RX ORDER — SERTRALINE HYDROCHLORIDE 100 MG/1
TABLET, FILM COATED ORAL
Qty: 30 TABLET | OUTPATIENT
Start: 2022-07-29

## 2022-07-29 NOTE — TELEPHONE ENCOUNTER
I called pt and med was mailed out to her home by [employer].  Pt received med

## 2022-09-12 ENCOUNTER — PATIENT MESSAGE (OUTPATIENT)
Dept: INTERNAL MEDICINE CLINIC | Age: 52
End: 2022-09-12

## 2022-09-12 DIAGNOSIS — R05.9 COUGH: Primary | ICD-10-CM

## 2022-09-12 NOTE — TELEPHONE ENCOUNTER
From: Shari Lowe  To: Dr. Leon Oh  Sent: 9/12/2022 4:57 PM EDT  Subject: persistant cough    Hello -   I have had this terrible cough for about 2 weeks now. No other symptoms. It is obviously worse at night when I am lying down. I try to prop myself up, but doesn't help a ton. I have even tried coming down to the couch, and get in almost a sitting position. I will get on a coughing spell for a good 20-30 minutes, 3-4 times a night. It will give me a headache, and I am obviously not getting much sleep. I have tried Zyrtec, and NyQuil at night, with not much success. I was wondering if you had any other suggestions for me. Thanks!   Jos Masterson

## 2022-09-13 ENCOUNTER — TELEMEDICINE (OUTPATIENT)
Dept: INTERNAL MEDICINE CLINIC | Age: 52
End: 2022-09-13
Payer: COMMERCIAL

## 2022-09-13 DIAGNOSIS — J40 BRONCHITIS: Primary | ICD-10-CM

## 2022-09-13 PROCEDURE — 99213 OFFICE O/P EST LOW 20 MIN: CPT | Performed by: INTERNAL MEDICINE

## 2022-09-13 RX ORDER — GUAIFENESIN AND CODEINE PHOSPHATE 100; 10 MG/5ML; MG/5ML
5-10 SOLUTION ORAL NIGHTLY PRN
Qty: 140 ML | Refills: 0 | Status: SHIPPED | OUTPATIENT
Start: 2022-09-13 | End: 2022-09-27

## 2022-09-16 RX ORDER — PREDNISONE 10 MG/1
40 TABLET ORAL DAILY
Qty: 20 TABLET | Refills: 0 | Status: SHIPPED | OUTPATIENT
Start: 2022-09-16 | End: 2022-09-21

## 2022-09-19 ENCOUNTER — HOSPITAL ENCOUNTER (OUTPATIENT)
Dept: GENERAL RADIOLOGY | Age: 52
Discharge: HOME | End: 2022-09-19
Payer: COMMERCIAL

## 2022-09-19 DIAGNOSIS — R05.9 COUGH: ICD-10-CM

## 2022-09-19 PROCEDURE — 71046 X-RAY EXAM CHEST 2 VIEWS: CPT

## 2022-09-21 NOTE — PROGRESS NOTES
Date of Visit:  2022    CC: Shaneka Harris (: 1970) is a 49 y.o. female, established patient, here for evaluation/re-evaluation of the following medical concerns:    ASSESSMENT/PLAN:  Bronchitis   No signs of bacterial superinfection. Suspect residual cough due to bronchospasm, so will try 2 week course of Symbicort. She may continue Delsym during the day and codeine cough syrup at night. Patient will call if symptoms change or worsen. If no significant improvement after 2 weeks of Symbicort, consider pulmonary referral.    Return if symptoms worsen or fail to improve. Vitals:    22 1055   BP: 122/60   Pulse: 84   SpO2: 98%   Weight: 150 lb (68 kg)   Height: 5' 2\" (1.575 m)      Estimated body mass index is 27.44 kg/m² as calculated from the following:    Height as of this encounter: 5' 2\" (1.575 m). Weight as of this encounter: 150 lb (68 kg). Wt Readings from Last 3 Encounters:   22 150 lb (68 kg)   22 150 lb 9.6 oz (68.3 kg)   21 151 lb (68.5 kg)     BP Readings from Last 3 Encounters:   22 122/60   22 118/72   21 104/70     HPI  Respiratory Symptoms:  Patients presents for follow up non-productive cough, particularly at night, which started about 4 weeks ago. Symptoms have been waxing and waning with time- overall improved. She denies any other symptoms. Smoking history:  She  reports that she has never smoked. She has never used smokeless tobacco. Treatment to date: codeine cough syrup and 5 day course of prednisone 40 mg qd- completed 22, Delsym during the day. Negative rapid COVID test early in course of illness. CXR clear 22. ROS  As documented above    Physical Exam  Constitutional:       Appearance: She is well-developed. Pulmonary:      Effort: Pulmonary effort is normal. No respiratory distress. Breath sounds: No stridor. Wheezing (with forced expiration) present. No rhonchi or rales.    Neurological:      Mental Status: She is alert and oriented to person, place, and time. Psychiatric:         Attention and Perception: Attention normal.         Mood and Affect: Mood and affect normal.         Speech: Speech normal.         Behavior: Behavior normal.         Thought Content: Thought content normal.         Cognition and Memory: Cognition normal.         Judgment: Judgment normal.         --Nichelle Arnold MD on 9/23/2022 at 11:23 AM    An electronic signature was used to authenticate this note.

## 2022-09-23 ENCOUNTER — OFFICE VISIT (OUTPATIENT)
Dept: INTERNAL MEDICINE CLINIC | Age: 52
End: 2022-09-23
Payer: COMMERCIAL

## 2022-09-23 VITALS
BODY MASS INDEX: 27.6 KG/M2 | WEIGHT: 150 LBS | HEART RATE: 84 BPM | HEIGHT: 62 IN | SYSTOLIC BLOOD PRESSURE: 122 MMHG | DIASTOLIC BLOOD PRESSURE: 60 MMHG | OXYGEN SATURATION: 98 %

## 2022-09-23 DIAGNOSIS — J40 BRONCHITIS: Primary | ICD-10-CM

## 2022-09-23 PROCEDURE — 99213 OFFICE O/P EST LOW 20 MIN: CPT | Performed by: INTERNAL MEDICINE

## 2022-09-23 RX ORDER — BUDESONIDE AND FORMOTEROL FUMARATE DIHYDRATE 160; 4.5 UG/1; UG/1
2 AEROSOL RESPIRATORY (INHALATION) 2 TIMES DAILY
Qty: 10.2 G | Refills: 0 | Status: SHIPPED | OUTPATIENT
Start: 2022-09-23 | End: 2022-10-19 | Stop reason: ALTCHOICE

## 2022-09-28 RX ORDER — IBUPROFEN 800 MG/1
800 TABLET ORAL EVERY 8 HOURS PRN
Qty: 50 TABLET | Refills: 1 | Status: SHIPPED | OUTPATIENT
Start: 2022-09-28

## 2022-10-18 RX ORDER — BUPROPION HYDROCHLORIDE 150 MG/1
TABLET ORAL
Qty: 30 TABLET | Refills: 0 | Status: SHIPPED | OUTPATIENT
Start: 2022-10-18 | End: 2022-10-20 | Stop reason: SDUPTHER

## 2022-10-18 NOTE — TELEPHONE ENCOUNTER
Was due for VV 9/19 to discuss efficacy of adding Wellbutrin in July. Please schedule, then I will refill short-term supply.

## 2022-10-19 NOTE — PROGRESS NOTES
Date of Visit:  10/20/2022    CC: Monroe Troncoso (: 1970) is a 49 y.o. female, established patient, here for evaluation/re-evaluation of the following medical concerns:    ASSESSMENT/PLAN:  Anxiety  Assessment & Plan:  Fatigue and anhedonia much improved with addition of low dose Wellbutrin XL- continue, with current dose of Zoloft. She will call if symptoms worsen during the winter- will increase Wellbutrin to 300 mg qam.   Mixed obsessional thoughts and acts  Assessment & Plan:  In complete remission on current dose of Zoloft- continue. Return in about 6 months (around 2023). Patient-Reported Vitals 10/20/2022   Patient-Reported Weight 149   Patient-Reported Height 5'2\"   Patient-Reported Systolic 743   Patient-Reported Diastolic 85   Patient-Reported Pulse n/a   Patient-Reported Temperature 96.8   Patient-Reported SpO2 n/a   Patient-Reported Peak Flow n/a        Wt Readings from Last 3 Encounters:   22 150 lb (68 kg)   22 150 lb 9.6 oz (68.3 kg)   21 151 lb (68.5 kg)     BP Readings from Last 3 Encounters:   22 122/60   22 118/72   21 104/70     Estimated body mass index is 27.44 kg/m² as calculated from the following:    Height as of 22: 5' 2\" (1.575 m). Weight as of 22: 150 lb (68 kg). HPI  Mood Disorder:  Patient presents for follow-up of anxiety disorder and obsessive-compulsive disorder. Current complaints include: anhedonia and fatigue- improved with addition of low dose Wellbutrin XL. She denies depressed mood, tearfulness, feelings of hopelessness, feelings of worthlessness/excessive guilt, insomnia, hypersomnia, changes in appetite/weight, difficulty concentrating, irritability, excessive worry, restlessness, panic attacks, obsessive thoughts, compulsive behaviors, increased use of drugs or alcohol, and suicidal thoughts or behavior. Symptoms/signs of licha: none. External stressors: son with special needs.  Current treatment includes: Zoloft- 100 mg qd and Wellbutrin- 150 mg qam.  Medication side effects: none. ROS  As documented above    Physical Exam  Constitutional:       General: She is not in acute distress. Appearance: She is well-developed. HENT:      Head: Normocephalic and atraumatic. Mouth/Throat:      Lips: No lesions. Neck:      Comments: No visible mass  Pulmonary:      Effort: Pulmonary effort is normal. No respiratory distress. Skin:     Comments: No rash, erythema or other discoloration on facial skin and exposed areas of neck and upper extremites    Neurological:      Mental Status: She is alert. Comments: No facial asymmetry (cranial nerve 7 motor function) or gaze palsy   Psychiatric:         Attention and Perception: Attention normal.         Mood and Affect: Mood normal.         Speech: Speech normal.         Behavior: Behavior normal.         Thought Content: Thought content normal.         Cognition and Memory: Cognition normal.         Suni Gonzalez was evaluated through a synchronous (real-time) audio-video encounter. The patient (or guardian if applicable) is aware that this is a billable service, which includes applicable co-pays. This Virtual Visit was conducted with patient's (and/or legal guardian's) consent. The visit was conducted pursuant to the emergency declaration under the [address] [place] [address] authority and the [employer] General Act. Patient identification was verified, and a caregiver was present when appropriate. The patient was located at Home: [address] [place] [address]. Provider was located at Home ([place] [address]): Robyn Soto MD on 10/20/2022 at 12:51 PM    An electronic signature was used to authenticate this note.

## 2022-10-20 ENCOUNTER — TELEMEDICINE (OUTPATIENT)
Dept: INTERNAL MEDICINE CLINIC | Age: 52
End: 2022-10-20
Payer: COMMERCIAL

## 2022-10-20 DIAGNOSIS — F41.9 ANXIETY: Primary | ICD-10-CM

## 2022-10-20 DIAGNOSIS — F42.2 MIXED OBSESSIONAL THOUGHTS AND ACTS: ICD-10-CM

## 2022-10-20 PROCEDURE — 99213 OFFICE O/P EST LOW 20 MIN: CPT | Performed by: INTERNAL MEDICINE

## 2022-10-20 RX ORDER — BUPROPION HYDROCHLORIDE 150 MG/1
150 TABLET ORAL EVERY MORNING
Qty: 90 TABLET | Refills: 1 | Status: SHIPPED | OUTPATIENT
Start: 2022-10-20

## 2022-10-20 NOTE — ASSESSMENT & PLAN NOTE
Fatigue and anhedonia much improved with addition of low dose Wellbutrin XL- continue, with current dose of Zoloft.   She will call if symptoms worsen during the winter- will increase Wellbutrin to 300 mg qam.

## 2023-01-25 RX ORDER — SERTRALINE HYDROCHLORIDE 100 MG/1
TABLET, FILM COATED ORAL
Qty: 90 TABLET | Refills: 0 | Status: SHIPPED | OUTPATIENT
Start: 2023-01-25

## 2023-01-25 NOTE — TELEPHONE ENCOUNTER
Last appointment: 10/20/2022  Next appointment: Visit date not found  Last refill: 7/19/22   Sent [employer] message to schedule next appointment due in April.

## 2023-03-20 RX ORDER — BUPROPION HYDROCHLORIDE 150 MG/1
150 TABLET ORAL EVERY MORNING
Qty: 90 TABLET | Refills: 1 | Status: SHIPPED | OUTPATIENT
Start: 2023-03-20 | End: 2023-03-21 | Stop reason: DRUGHIGH

## 2023-03-20 NOTE — TELEPHONE ENCOUNTER
Medication:   Requested Prescriptions     Pending Prescriptions Disp Refills    buPROPion (WELLBUTRIN XL) 150 MG extended release tablet 90 tablet 1     Sig: Take 1 tablet by mouth every morning     Last Filled:  #90 with 1 refill on 10/20/22     Last appt: 10/20/2022   Next appt: 4/21/2023    Last OARRS:   RX Monitoring 9/13/2022   Acute Pain Prescriptions Prescription exceeds daily limit for a specific reason. See comments or note.

## 2023-03-21 ENCOUNTER — TELEPHONE (OUTPATIENT)
Dept: INTERNAL MEDICINE CLINIC | Age: 53
End: 2023-03-21

## 2023-03-21 ENCOUNTER — TELEPHONE (OUTPATIENT)
Dept: ADMINISTRATIVE | Age: 53
End: 2023-03-21

## 2023-03-21 RX ORDER — BUPROPION HYDROCHLORIDE 300 MG/1
300 TABLET ORAL EVERY MORNING
Qty: 30 TABLET | Refills: 5 | Status: SHIPPED | OUTPATIENT
Start: 2023-03-21

## 2023-03-21 NOTE — TELEPHONE ENCOUNTER
Rx was sent to [facility] pharmacy. Needed to go to [place].  Gave verbal over the phone to [place] and cancelled rx at [facility]

## 2023-03-21 NOTE — TELEPHONE ENCOUNTER
Lexie Castillo is calling to see if prescription can be changed once again. Stated that insurance will not pay for the 150 MG and wanted to see if Dr. Sterling Avery would change it to 300 MG once daily. Can you resend/ contact pharmacy to change.

## 2023-03-21 NOTE — TELEPHONE ENCOUNTER
Pharmacy is calling to receive clarifications on instructions for:   buPROPion (WELLBUTRIN XL) 150 MG extended release tablet    Prescription was sent to the pharmacy with instructions of \"take one tablet by mouth every morning. \" Patient told pharmacist that she takes 2 every morning. Please contact pharmacy to update this. A script with the correct instructions was sent to [facility]. M for patient to confirm she does want prescription sent to [id number]      Please contact [facility] at   [facility], [address]   [address] [place] [id number]   Phone:  [phone]  Fax:  [phone]

## 2023-03-21 NOTE — TELEPHONE ENCOUNTER
Patient is wondering if her insurance will not cover 150mg tablets at [facility] because the 150MG 2 tablets daily was already filled by [facility]? She received a message this morning that [facility] had filled the prescription that was sent this morning. If that is the case, patient would like it cancelled at [facility] and sent to [facility] if possible. If not, patient will also  the 150mg 2 tablets from [facility] if she has to. Patient also stated if that is not the issue, she would be willing to try 300mg 1 tablet for 30 days.        Please contact patient to further discuss

## 2023-03-21 NOTE — TELEPHONE ENCOUNTER
The new script was sent with the correct sign of 2/day. If this will not work, please pend to the desired pharmacy.

## 2023-04-18 RX ORDER — BUPROPION HYDROCHLORIDE 150 MG/1
TABLET ORAL
Qty: 90 TABLET | Refills: 0 | Status: SHIPPED | OUTPATIENT
Start: 2023-04-18

## 2023-04-18 RX ORDER — SERTRALINE HYDROCHLORIDE 100 MG/1
TABLET, FILM COATED ORAL
Qty: 90 TABLET | Refills: 0 | Status: SHIPPED | OUTPATIENT
Start: 2023-04-18

## 2023-04-18 NOTE — TELEPHONE ENCOUNTER
Last appointment: 10/20/2022  Next appointment: 5/26/2023  Last refill: script needs to go to mail order instead of local pharmacy

## 2023-05-25 PROBLEM — D12.5 ADENOMATOUS POLYP OF SIGMOID COLON: Status: ACTIVE | Noted: 2023-05-25

## 2023-05-25 NOTE — PROGRESS NOTES
Date of Visit:  2023    CC: Roman Jeffery (: 1970) is a 47 y.o. female, established patient, here for evaluation/re-evaluation of the following medical concerns:    ASSESSMENT/PLAN:  Chronic migraine without aura without status migrainosus, not intractable  Assessment & Plan:  Cause for recent exacerbation unclear, but possibilities include weather/allergies and increased stress related to end of school year for special needs son. Since she has noticed some bilateral ear congestion over the same time frame, she will try daily Flonase. Continue current prophylactic and abortive therapy per neurology, but may benefit from addition of Emgality. Pure hypercholesterolemia  Assessment & Plan:  Importance of continued lifestyle changes stressed to help prevent need for cholesterol medication in the future. Orders:  -     Lipid, Fasting; Future  -     Comprehensive Metabolic Panel, Fasting; Future  Anxiety  Assessment & Plan:  Anxiety and OCD symptoms well-controlled on current dose of Zoloft, and anhedonia and fatigue improved on 300 mg dose of Wellbutrin XL. However, since stress related to special needs son is decreased during the summer, she will try decreasing Wellbutrin dose to 150 mg qam, continuing current dose of Zoloft. Mixed obsessional thoughts and acts  Assessment & Plan:  See plan for anxiety. Return in about 6 months (around 2023) for CPE. Vitals:    23 1606   BP: 102/68   Pulse: 85   SpO2: 98%   Weight: 145 lb (65.8 kg)      Estimated body mass index is 26.52 kg/m² as calculated from the following:    Height as of 22: 5' 2\" (1.575 m). Weight as of this encounter: 145 lb (65.8 kg).      Wt Readings from Last 3 Encounters:   23 145 lb (65.8 kg)   22 150 lb (68 kg)   22 150 lb 9.6 oz (68.3 kg)     BP Readings from Last 3 Encounters:   23 102/68   22 122/60   22 118/72     HPI  Chronic Headache:  Patient presents for follow-up

## 2023-05-26 ENCOUNTER — OFFICE VISIT (OUTPATIENT)
Dept: INTERNAL MEDICINE CLINIC | Age: 53
End: 2023-05-26
Payer: COMMERCIAL

## 2023-05-26 VITALS
DIASTOLIC BLOOD PRESSURE: 68 MMHG | WEIGHT: 145 LBS | HEART RATE: 85 BPM | OXYGEN SATURATION: 98 % | BODY MASS INDEX: 26.52 KG/M2 | SYSTOLIC BLOOD PRESSURE: 102 MMHG

## 2023-05-26 DIAGNOSIS — F42.2 MIXED OBSESSIONAL THOUGHTS AND ACTS: ICD-10-CM

## 2023-05-26 DIAGNOSIS — G43.709 CHRONIC MIGRAINE WITHOUT AURA WITHOUT STATUS MIGRAINOSUS, NOT INTRACTABLE: Primary | ICD-10-CM

## 2023-05-26 DIAGNOSIS — E78.00 PURE HYPERCHOLESTEROLEMIA: ICD-10-CM

## 2023-05-26 DIAGNOSIS — F41.9 ANXIETY: ICD-10-CM

## 2023-05-26 PROCEDURE — 99213 OFFICE O/P EST LOW 20 MIN: CPT | Performed by: INTERNAL MEDICINE

## 2023-05-26 SDOH — ECONOMIC STABILITY: HOUSING INSECURITY
IN THE LAST 12 MONTHS, WAS THERE A TIME WHEN YOU DID NOT HAVE A STEADY PLACE TO SLEEP OR SLEPT IN A SHELTER (INCLUDING NOW)?: NO

## 2023-05-26 SDOH — ECONOMIC STABILITY: FOOD INSECURITY: WITHIN THE PAST 12 MONTHS, THE FOOD YOU BOUGHT JUST DIDN'T LAST AND YOU DIDN'T HAVE MONEY TO GET MORE.: NEVER TRUE

## 2023-05-26 SDOH — ECONOMIC STABILITY: FOOD INSECURITY: WITHIN THE PAST 12 MONTHS, YOU WORRIED THAT YOUR FOOD WOULD RUN OUT BEFORE YOU GOT MONEY TO BUY MORE.: NEVER TRUE

## 2023-05-26 SDOH — ECONOMIC STABILITY: INCOME INSECURITY: HOW HARD IS IT FOR YOU TO PAY FOR THE VERY BASICS LIKE FOOD, HOUSING, MEDICAL CARE, AND HEATING?: NOT HARD AT ALL

## 2023-05-26 ASSESSMENT — PATIENT HEALTH QUESTIONNAIRE - PHQ9
6. FEELING BAD ABOUT YOURSELF - OR THAT YOU ARE A FAILURE OR HAVE LET YOURSELF OR YOUR FAMILY DOWN: 0
SUM OF ALL RESPONSES TO PHQ QUESTIONS 1-9: 3
SUM OF ALL RESPONSES TO PHQ QUESTIONS 1-9: 3
7. TROUBLE CONCENTRATING ON THINGS, SUCH AS READING THE NEWSPAPER OR WATCHING TELEVISION: 1
SUM OF ALL RESPONSES TO PHQ9 QUESTIONS 1 & 2: 0
1. LITTLE INTEREST OR PLEASURE IN DOING THINGS: 0
SUM OF ALL RESPONSES TO PHQ QUESTIONS 1-9: 3
4. FEELING TIRED OR HAVING LITTLE ENERGY: 1
2. FEELING DOWN, DEPRESSED OR HOPELESS: 0
3. TROUBLE FALLING OR STAYING ASLEEP: 1
8. MOVING OR SPEAKING SO SLOWLY THAT OTHER PEOPLE COULD HAVE NOTICED. OR THE OPPOSITE, BEING SO FIGETY OR RESTLESS THAT YOU HAVE BEEN MOVING AROUND A LOT MORE THAN USUAL: 0
9. THOUGHTS THAT YOU WOULD BE BETTER OFF DEAD, OR OF HURTING YOURSELF: 0
5. POOR APPETITE OR OVEREATING: 0
10. IF YOU CHECKED OFF ANY PROBLEMS, HOW DIFFICULT HAVE THESE PROBLEMS MADE IT FOR YOU TO DO YOUR WORK, TAKE CARE OF THINGS AT HOME, OR GET ALONG WITH OTHER PEOPLE: 0
SUM OF ALL RESPONSES TO PHQ QUESTIONS 1-9: 3

## 2023-05-26 ASSESSMENT — ANXIETY QUESTIONNAIRES
2. NOT BEING ABLE TO STOP OR CONTROL WORRYING: 0
5. BEING SO RESTLESS THAT IT IS HARD TO SIT STILL: 0
IF YOU CHECKED OFF ANY PROBLEMS ON THIS QUESTIONNAIRE, HOW DIFFICULT HAVE THESE PROBLEMS MADE IT FOR YOU TO DO YOUR WORK, TAKE CARE OF THINGS AT HOME, OR GET ALONG WITH OTHER PEOPLE: NOT DIFFICULT AT ALL
3. WORRYING TOO MUCH ABOUT DIFFERENT THINGS: 1
7. FEELING AFRAID AS IF SOMETHING AWFUL MIGHT HAPPEN: 0
6. BECOMING EASILY ANNOYED OR IRRITABLE: 2
GAD7 TOTAL SCORE: 5
1. FEELING NERVOUS, ANXIOUS, OR ON EDGE: 1
4. TROUBLE RELAXING: 1

## 2023-05-26 NOTE — ASSESSMENT & PLAN NOTE
Cause for recent exacerbation unclear, but possibilities include weather/allergies and increased stress related to end of school year for special needs son. Since she has noticed some bilateral ear congestion over the same time frame, she will try daily Flonase. Continue current prophylactic and abortive therapy per neurology, but may benefit from addition of Emgality.

## 2023-05-26 NOTE — ASSESSMENT & PLAN NOTE
Anxiety and OCD symptoms well-controlled on current dose of Zoloft, and anhedonia and fatigue improved on 300 mg dose of Wellbutrin XL. However, since stress related to special needs son is decreased during the summer, she will try decreasing Wellbutrin dose to 150 mg qam, continuing current dose of Zoloft.

## 2023-07-10 RX ORDER — SERTRALINE HYDROCHLORIDE 100 MG/1
TABLET, FILM COATED ORAL
Qty: 90 TABLET | Refills: 1 | Status: SHIPPED | OUTPATIENT
Start: 2023-07-10

## 2023-07-24 RX ORDER — BUPROPION HYDROCHLORIDE 150 MG/1
TABLET ORAL
Qty: 90 TABLET | Refills: 0 | Status: SHIPPED | OUTPATIENT
Start: 2023-07-24

## 2023-07-24 NOTE — TELEPHONE ENCOUNTER
Last appointment: 5/26/2023  Next appointment: 12/1/2023  Last refill: 6/16 - local and needs to go to mail order  Please advise as DOD.  Thank you

## 2023-10-23 RX ORDER — BUPROPION HYDROCHLORIDE 150 MG/1
TABLET ORAL
Qty: 90 TABLET | Refills: 1 | Status: SHIPPED | OUTPATIENT
Start: 2023-10-23

## 2023-11-08 DIAGNOSIS — E78.00 PURE HYPERCHOLESTEROLEMIA: ICD-10-CM

## 2023-11-08 LAB
ALBUMIN SERPL-MCNC: 4.6 G/DL (ref 3.4–5)
ALBUMIN/GLOB SERPL: 2.3 {RATIO} (ref 1.1–2.2)
ALP SERPL-CCNC: 82 U/L (ref 40–129)
ALT SERPL-CCNC: 14 U/L (ref 10–40)
ANION GAP SERPL CALCULATED.3IONS-SCNC: 13 MMOL/L (ref 3–16)
AST SERPL-CCNC: 20 U/L (ref 15–37)
BILIRUB SERPL-MCNC: 0.4 MG/DL (ref 0–1)
BUN SERPL-MCNC: 15 MG/DL (ref 7–20)
CALCIUM SERPL-MCNC: 9.4 MG/DL (ref 8.3–10.6)
CHLORIDE SERPL-SCNC: 108 MMOL/L (ref 99–110)
CHOLEST SERPL-MCNC: 205 MG/DL (ref 0–199)
CO2 SERPL-SCNC: 23 MMOL/L (ref 21–32)
CREAT SERPL-MCNC: 1.1 MG/DL (ref 0.6–1.1)
GFR SERPLBLD CREATININE-BSD FMLA CKD-EPI: >60 ML/MIN/{1.73_M2}
GLUCOSE P FAST SERPL-MCNC: 87 MG/DL (ref 70–99)
HDLC SERPL-MCNC: 50 MG/DL (ref 40–60)
LDL CHOLESTEROL CALCULATED: 135 MG/DL
POTASSIUM SERPL-SCNC: 3.9 MMOL/L (ref 3.5–5.1)
PROT SERPL-MCNC: 6.6 G/DL (ref 6.4–8.2)
SODIUM SERPL-SCNC: 144 MMOL/L (ref 136–145)
TRIGL SERPL-MCNC: 98 MG/DL (ref 0–150)
VLDLC SERPL CALC-MCNC: 20 MG/DL

## 2023-11-22 NOTE — PROGRESS NOTES
03/22/2024    Depression Screen  05/26/2024    Diabetes screen  11/08/2026    Cervical cancer screen  04/05/2027    Colorectal Cancer Screen  04/28/2028    Lipids  11/08/2028    Flu vaccine  Completed    Shingles vaccine  Completed    Hepatitis A vaccine  Aged Out    Hib vaccine  Aged Out    Meningococcal (ACWY) vaccine  Aged Out    Pneumococcal 0-64 years Vaccine  Aged Out    Hepatitis C screen  Discontinued    HIV screen  Discontinued       --Buddy Dior MD on 12/1/2023 at 9:40 AM    An electronic signature was used to authenticate this note.

## 2023-12-01 ENCOUNTER — OFFICE VISIT (OUTPATIENT)
Dept: INTERNAL MEDICINE CLINIC | Age: 53
End: 2023-12-01
Payer: COMMERCIAL

## 2023-12-01 VITALS
HEART RATE: 82 BPM | WEIGHT: 145 LBS | HEIGHT: 63 IN | SYSTOLIC BLOOD PRESSURE: 98 MMHG | DIASTOLIC BLOOD PRESSURE: 70 MMHG | BODY MASS INDEX: 25.69 KG/M2 | OXYGEN SATURATION: 99 %

## 2023-12-01 DIAGNOSIS — M25.552 BILATERAL HIP PAIN: ICD-10-CM

## 2023-12-01 DIAGNOSIS — E78.00 PURE HYPERCHOLESTEROLEMIA: ICD-10-CM

## 2023-12-01 DIAGNOSIS — F41.9 ANXIETY: ICD-10-CM

## 2023-12-01 DIAGNOSIS — H93.8X3 CONGESTION OF BOTH EARS: ICD-10-CM

## 2023-12-01 DIAGNOSIS — Z00.00 ANNUAL PHYSICAL EXAM: Primary | ICD-10-CM

## 2023-12-01 DIAGNOSIS — Z23 NEED FOR INFLUENZA VACCINATION: ICD-10-CM

## 2023-12-01 DIAGNOSIS — M25.551 BILATERAL HIP PAIN: ICD-10-CM

## 2023-12-01 DIAGNOSIS — F42.2 MIXED OBSESSIONAL THOUGHTS AND ACTS: ICD-10-CM

## 2023-12-01 PROCEDURE — 90715 TDAP VACCINE 7 YRS/> IM: CPT | Performed by: INTERNAL MEDICINE

## 2023-12-01 PROCEDURE — 90472 IMMUNIZATION ADMIN EACH ADD: CPT | Performed by: INTERNAL MEDICINE

## 2023-12-01 PROCEDURE — 90674 CCIIV4 VAC NO PRSV 0.5 ML IM: CPT | Performed by: INTERNAL MEDICINE

## 2023-12-01 PROCEDURE — 99396 PREV VISIT EST AGE 40-64: CPT | Performed by: INTERNAL MEDICINE

## 2023-12-01 PROCEDURE — 90471 IMMUNIZATION ADMIN: CPT | Performed by: INTERNAL MEDICINE

## 2023-12-01 RX ORDER — SERTRALINE HYDROCHLORIDE 100 MG/1
100 TABLET, FILM COATED ORAL DAILY
Qty: 90 TABLET | Refills: 1 | Status: SHIPPED | OUTPATIENT
Start: 2023-12-01

## 2023-12-01 ASSESSMENT — ENCOUNTER SYMPTOMS
GASTROINTESTINAL NEGATIVE: 1
RESPIRATORY NEGATIVE: 1
EYES NEGATIVE: 1

## 2023-12-01 NOTE — ASSESSMENT & PLAN NOTE
Long-standing. You improvement with nasal steroid. No evidence of obstruction on exam. Suspect eustachian tube dysfunction- referred to ENT for further evaluation and treatment.

## 2023-12-01 NOTE — ASSESSMENT & PLAN NOTE
Anxiety and OCD symptoms well-controlled on current dose of Zoloft, depression symptoms improved in higher dose of Wellbutrin XL, which she increased about one month ago- continue through the winter months.

## 2024-03-14 ENCOUNTER — E-VISIT (OUTPATIENT)
Dept: INTERNAL MEDICINE CLINIC | Age: 54
End: 2024-03-14
Payer: COMMERCIAL

## 2024-03-14 ENCOUNTER — TELEPHONE (OUTPATIENT)
Dept: INTERNAL MEDICINE CLINIC | Age: 54
End: 2024-03-14

## 2024-03-14 ENCOUNTER — HOSPITAL ENCOUNTER (OUTPATIENT)
Age: 54
Setting detail: SPECIMEN
Discharge: HOME | End: 2024-03-14
Payer: COMMERCIAL

## 2024-03-14 DIAGNOSIS — R35.0 URINARY FREQUENCY: Primary | ICD-10-CM

## 2024-03-14 LAB
BACTERIA URNS QL MICRO: ABNORMAL /HPF
BILIRUB UR QL STRIP.AUTO: NEGATIVE
CLARITY UR: ABNORMAL
COLOR UR: YELLOW
EPI CELLS #/AREA URNS AUTO: 0 /HPF (ref 0–5)
GLUCOSE UR STRIP.AUTO-MCNC: NEGATIVE MG/DL
HGB UR QL STRIP.AUTO: ABNORMAL
HYALINE CASTS #/AREA URNS AUTO: 0 /LPF (ref 0–8)
KETONES UR STRIP.AUTO-MCNC: ABNORMAL MG/DL
LEUKOCYTE ESTERASE UR QL STRIP.AUTO: ABNORMAL
NITRITE UR QL STRIP.AUTO: POSITIVE
PH UR STRIP.AUTO: 5.5 [PH] (ref 5–8)
PROT UR STRIP.AUTO-MCNC: 100 MG/DL
RBC CLUMPS #/AREA URNS AUTO: 321 /HPF (ref 0–4)
SP GR UR STRIP.AUTO: 1.02 (ref 1–1.03)
UA DIPSTICK W REFLEX MICRO PNL UR: YES
URN SPEC COLLECT METH UR: ABNORMAL
UROBILINOGEN UR STRIP-ACNC: 1 E.U./DL
WBC #/AREA URNS AUTO: 169 /HPF (ref 0–5)

## 2024-03-14 PROCEDURE — 81002 URINALYSIS NONAUTO W/O SCOPE: CPT | Performed by: INTERNAL MEDICINE

## 2024-03-14 PROCEDURE — 99421 OL DIG E/M SVC 5-10 MIN: CPT | Performed by: INTERNAL MEDICINE

## 2024-03-14 PROCEDURE — 81001 URINALYSIS AUTO W/SCOPE: CPT

## 2024-03-14 RX ORDER — SULFAMETHOXAZOLE AND TRIMETHOPRIM 800; 160 MG/1; MG/1
1 TABLET ORAL 2 TIMES DAILY
Qty: 14 TABLET | Refills: 0 | Status: SHIPPED | OUTPATIENT
Start: 2024-03-14 | End: 2024-03-21

## 2024-03-14 NOTE — TELEPHONE ENCOUNTER
She can do e-visit & drop off specimen or be scheduled for an acute appt, with pcp, tomorrow. (15 min ok)

## 2024-03-14 NOTE — TELEPHONE ENCOUNTER
Patient called because she believes she may have the onset of a UTI/never had one before.  She started this morning with frequency and a tightness after she urinates that makes her feel like bladder is not totally empty.  No fever, no lower back pain and no blood in urine.  She is going to try and do an e-visit after she gets out of her meeting (1 1/2 hours or so).  She is wanting to  address before the weekend/symptoms worsen.

## 2024-03-14 NOTE — PROGRESS NOTES
HPI: as per patient provided history  Exam: N/A (electronic visit)  ASSESSMENT/PLAN:  1. Urinary frequency  No dysuria, so would prefer to see UA results before considering empiric antibiotic. Will ask clinical staff to contact patient to drop off sample.  - POCT Urinalysis no Micro    Patient instructed to call the office if worsens, or fails to improve as anticipated.    5-10 minutes were spent on the digital evaluation and management of this patient.    Addendum: Reviewed UA results- very suggestive of urinary tract infection. Will start empiric Bactrim DS 1 bid x7 days and add on urine culture.

## 2024-04-16 RX ORDER — BUPROPION HYDROCHLORIDE 300 MG/1
300 TABLET ORAL EVERY MORNING
Qty: 90 TABLET | Refills: 1 | Status: SHIPPED | OUTPATIENT
Start: 2024-04-16

## 2024-04-16 RX ORDER — SERTRALINE HYDROCHLORIDE 100 MG/1
100 TABLET, FILM COATED ORAL DAILY
Qty: 90 TABLET | Refills: 1 | Status: SHIPPED | OUTPATIENT
Start: 2024-04-16

## 2024-04-16 RX ORDER — IBUPROFEN 800 MG/1
800 TABLET ORAL EVERY 8 HOURS PRN
Qty: 50 TABLET | Refills: 1 | Status: SHIPPED | OUTPATIENT
Start: 2024-04-16

## 2024-04-16 NOTE — TELEPHONE ENCOUNTER
Medication:   Requested Prescriptions     Pending Prescriptions Disp Refills    ibuprofen (ADVIL;MOTRIN) 800 MG tablet 50 tablet 1     Sig: Take 1 tablet by mouth every 8 hours as needed for Pain Take with food.    buPROPion (WELLBUTRIN XL) 300 MG extended release tablet 30 tablet 5     Sig: Take 1 tablet by mouth every morning    sertraline (ZOLOFT) 100 MG tablet 90 tablet 1     Sig: Take 1 tablet by mouth daily     Last Filled: IBU 50 with 1 on 9/28/22, wellbutrin  30 with 5 refills on 3/21/23 and soloft 50 with 1 refill on 9/28/22    Last appt: 3/14/2024   Next appt: Visit date not found    Last OARRS:       9/13/2022     1:17 PM   RX Monitoring   Acute Pain Prescriptions Prescription exceeds daily limit for a specific reason. See comments or note.

## 2024-12-09 ENCOUNTER — OFFICE VISIT (OUTPATIENT)
Age: 54
End: 2024-12-09

## 2024-12-09 VITALS
SYSTOLIC BLOOD PRESSURE: 102 MMHG | DIASTOLIC BLOOD PRESSURE: 72 MMHG | OXYGEN SATURATION: 98 % | HEART RATE: 81 BPM | TEMPERATURE: 98.3 F | WEIGHT: 144.4 LBS | BODY MASS INDEX: 25.99 KG/M2

## 2024-12-09 DIAGNOSIS — H50.112 EXOTROPIA OF LEFT EYE: Primary | ICD-10-CM

## 2024-12-09 DIAGNOSIS — H69.92 ETD (EUSTACHIAN TUBE DYSFUNCTION), LEFT: ICD-10-CM

## 2024-12-09 DIAGNOSIS — R42 LIGHT HEADED: ICD-10-CM

## 2024-12-09 DIAGNOSIS — H65.02 ACUTE SEROUS OTITIS MEDIA OF LEFT EAR, RECURRENCE NOT SPECIFIED: ICD-10-CM

## 2024-12-09 RX ORDER — CETIRIZINE HYDROCHLORIDE 10 MG/1
10 TABLET ORAL
Qty: 14 TABLET | Refills: 0 | Status: SHIPPED | OUTPATIENT
Start: 2024-12-09 | End: 2024-12-23

## 2024-12-09 RX ORDER — MECLIZINE HYDROCHLORIDE 25 MG/1
25 TABLET ORAL 3 TIMES DAILY PRN
Qty: 30 TABLET | Refills: 0 | Status: SHIPPED | OUTPATIENT
Start: 2024-12-09 | End: 2024-12-19

## 2024-12-09 NOTE — PATIENT INSTRUCTIONS
Denise,    Thank you for trusting [facility] with your health care needs. Your decision to come to us means a lot, and we are honored to be part of your healthcare journey.  At [facility], our dedicated team is committed to providing you with the highest quality of care in a warm and welcoming environment. Your health and well-being are our top priorities, and we appreciate the opportunity to serve you.    Thank you for choosing us, and we’re here for you whenever you need us!    Warm regards,       [facility] Team    [] Dr. Sutton [] CHRISTINA Saeed, Supervisor       [] TANK Parr    [] RT Suri      [] CHRISTINA Byrnes  [] CHRISTINA Hill   [] CHRISTINA Perea    [] CHRISTINA Turner

## 2024-12-10 NOTE — PROGRESS NOTES
Denise Mike (: 1970) is a 53 y.o. female, Established patient, here for evaluation of the following chief complaint(s):  Ear Pain (Light headed , when the pt move her head she hears a weird noise . )      ASSESSMENT/PLAN:    ICD-10-CM    1. Exotropia of left eye  H50.112       2. Light headed  R42 cetirizine (ZYRTEC) 10 MG tablet     meclizine (ANTIVERT) 25 MG tablet      3. Acute serous otitis media of left ear, recurrence not specified  H65.02 cetirizine (ZYRTEC) 10 MG tablet     meclizine (ANTIVERT) 25 MG tablet      4. ETD (Eustachian tube dysfunction), left  H69.92             Discussed PCP follow up for persisting or worsening symptoms, or to return to the clinic if unable to obtain PCP follow up for worsening symptoms.    The patient tolerated their visit well. The patient and/or the family were informed of the results of any tests, a time was given to answer questions, a plan was proposed and they agreed with plan. Reviewed AVS with treatment instructions and answered questions - pt/family expresses understanding and agreement with the discussed treatment plan and AVS instructions.      SUBJECTIVE/OBJECTIVE:  HPI     Ear Pain     Additional comments: Light headed , when the pt move her head she hears a   weird noise .           Last edited by Pacheco Herrera MA on 2024 12:08 PM.            Ear Pain         VITAL SIGNS  Vitals:    24 1208   BP: 102/72   Site: Right Upper Arm   Position: Sitting   Cuff Size: Small Adult   Pulse: 81   Temp: 98.3 °F (36.8 °C)   TempSrc: Oral   SpO2: 98%   Weight: 65.5 kg (144 lb 6.4 oz)       Review of Systems   HENT:  Positive for ear pain.      See HPI for pertinent positives and negatives.    Physical Exam  Constitutional:       General: She is not in acute distress.     Appearance: Normal appearance.   HENT:      Head: Normocephalic and atraumatic.      Right Ear: Tympanic membrane and ear canal normal.      Left Ear: Ear canal normal.      Ears: